# Patient Record
Sex: FEMALE | Employment: OTHER | URBAN - METROPOLITAN AREA
[De-identification: names, ages, dates, MRNs, and addresses within clinical notes are randomized per-mention and may not be internally consistent; named-entity substitution may affect disease eponyms.]

---

## 2024-10-11 ENCOUNTER — OFFICE VISIT (OUTPATIENT)
Dept: ORTHOPEDIC SURGERY | Age: 89
End: 2024-10-11

## 2024-10-11 VITALS — HEIGHT: 68 IN | BODY MASS INDEX: 30.31 KG/M2 | WEIGHT: 200 LBS

## 2024-10-11 DIAGNOSIS — S72.421A CLOSED BICONDYLAR FRACTURE OF RIGHT FEMUR, INITIAL ENCOUNTER (HCC): ICD-10-CM

## 2024-10-11 DIAGNOSIS — S72.431A CLOSED BICONDYLAR FRACTURE OF RIGHT FEMUR, INITIAL ENCOUNTER (HCC): ICD-10-CM

## 2024-10-11 DIAGNOSIS — M79.604 RIGHT LEG PAIN: Primary | ICD-10-CM

## 2024-10-11 NOTE — PROGRESS NOTES
Dr Inder Sood      Date /Time 10/11/2024       8:16 AM EDT  Name Daisha Valiente             5/16/1930   [place]  [facility] [place] [facility]  MRN [id number]                Chief Complaint   Patient presents with    New Patient     N Right Femur (10/9/24/ER) Abdoulaye Forde [phone]        History of Present Illness  Daisha Valiente is a 89 y.o. female who presents with  right knee pain, .    Sent in consultation by Keysha Wood MD, .      Injury Mechanism: Kicked Kristen lift.  Worker's Comp. & legal issues:   none.  Previous Treatments: Ice, Heat, and NSAIDs    Patient presents to the office today for a new problem.  Patient brought in by transportation is on a stretcher.  She is here with a chief complaint of right knee pain.  She was having a catheter placed on 10/9/2024 became combative and kicked her Kristen lift.  A mobile x-ray was taken and supposedly demonstrated a distal femur fracture.  She is not in any immobilization at this time.  History today obtained per her Sister María Smith.  Patient supposedly had a medial femoral condyle fracture.  There was a CT scan performed at [facility].  Patient supposedly has not ambulated in many years.  She also states that she has no pain at rest.    Patient is on morphine and tramadol for pain control.  She does have a DNR order and is already in hospice.    Past History  No past medical history on file.  No past surgical history on file.  Social History     Tobacco Use    Smoking status: Not on file    Smokeless tobacco: Not on file   Substance Use Topics    Alcohol use: Not on file      No current outpatient medications on file prior to visit.     No current facility-administered medications on file prior to visit.      ASCVD 10-YEAR RISK SCORE  The ASCVD Risk score (Josselyn DK, et al., 2019) failed to calculate for the following reasons:    The 2019 ASCVD risk score is only valid for ages 40 to 79     Review of Systems  10-point ROS is negative other than

## 2024-10-16 ENCOUNTER — TELEPHONE (OUTPATIENT)
Dept: ORTHOPEDIC SURGERY | Age: 89
End: 2024-10-16

## 2024-10-16 NOTE — TELEPHONE ENCOUNTER
BASSAM FROM [facility] CALLED REGARDING THE IMMOBILIZER PATIENT WAS PUT IN LAST WEEK.  SHE IS NOT ABLE TO TOLERATE HAVING HER LEG STRAIGHT SO SHE'S NOT SURE IT IS REALLY DOING ANY GOOD.  PLEASE CALL BASSAM TO DISCUSS  [phone].

## 2024-11-13 ENCOUNTER — OFFICE VISIT (OUTPATIENT)
Dept: ORTHOPEDIC SURGERY | Age: 89
End: 2024-11-13
Payer: COMMERCIAL

## 2024-11-13 VITALS — WEIGHT: 200 LBS | HEIGHT: 67 IN | BODY MASS INDEX: 31.39 KG/M2

## 2024-11-13 DIAGNOSIS — S72.431A CLOSED BICONDYLAR FRACTURE OF RIGHT FEMUR, INITIAL ENCOUNTER (HCC): Primary | ICD-10-CM

## 2024-11-13 DIAGNOSIS — S72.421A CLOSED BICONDYLAR FRACTURE OF RIGHT FEMUR, INITIAL ENCOUNTER (HCC): Primary | ICD-10-CM

## 2024-11-13 PROCEDURE — 1123F ACP DISCUSS/DSCN MKR DOCD: CPT | Performed by: PHYSICIAN ASSISTANT

## 2024-11-13 PROCEDURE — G8417 CALC BMI ABV UP PARAM F/U: HCPCS | Performed by: PHYSICIAN ASSISTANT

## 2024-11-13 PROCEDURE — G8427 DOCREV CUR MEDS BY ELIG CLIN: HCPCS | Performed by: PHYSICIAN ASSISTANT

## 2024-11-13 PROCEDURE — 99213 OFFICE O/P EST LOW 20 MIN: CPT | Performed by: PHYSICIAN ASSISTANT

## 2024-11-13 PROCEDURE — 1036F TOBACCO NON-USER: CPT | Performed by: PHYSICIAN ASSISTANT

## 2024-11-13 PROCEDURE — 1090F PRES/ABSN URINE INCON ASSESS: CPT | Performed by: PHYSICIAN ASSISTANT

## 2024-11-13 PROCEDURE — G8484 FLU IMMUNIZE NO ADMIN: HCPCS | Performed by: PHYSICIAN ASSISTANT

## 2024-11-13 PROCEDURE — 1159F MED LIST DOCD IN RCRD: CPT | Performed by: PHYSICIAN ASSISTANT
